# Patient Record
Sex: MALE | Race: WHITE | NOT HISPANIC OR LATINO | Employment: FULL TIME | URBAN - METROPOLITAN AREA
[De-identification: names, ages, dates, MRNs, and addresses within clinical notes are randomized per-mention and may not be internally consistent; named-entity substitution may affect disease eponyms.]

---

## 2018-11-09 ENCOUNTER — APPOINTMENT (OUTPATIENT)
Dept: RADIOLOGY | Facility: CLINIC | Age: 33
End: 2018-11-09
Attending: FAMILY MEDICINE
Payer: COMMERCIAL

## 2018-11-09 ENCOUNTER — OFFICE VISIT (OUTPATIENT)
Dept: URGENT CARE | Facility: CLINIC | Age: 33
End: 2018-11-09
Payer: COMMERCIAL

## 2018-11-09 VITALS
DIASTOLIC BLOOD PRESSURE: 80 MMHG | BODY MASS INDEX: 30.56 KG/M2 | HEIGHT: 73 IN | RESPIRATION RATE: 16 BRPM | TEMPERATURE: 99.4 F | WEIGHT: 230.6 LBS | OXYGEN SATURATION: 96 % | HEART RATE: 104 BPM | SYSTOLIC BLOOD PRESSURE: 115 MMHG

## 2018-11-09 DIAGNOSIS — R05.9 COUGH: ICD-10-CM

## 2018-11-09 DIAGNOSIS — J20.9 ACUTE BRONCHITIS, UNSPECIFIED ORGANISM: Primary | ICD-10-CM

## 2018-11-09 PROCEDURE — 99203 OFFICE O/P NEW LOW 30 MIN: CPT | Performed by: FAMILY MEDICINE

## 2018-11-09 PROCEDURE — 71046 X-RAY EXAM CHEST 2 VIEWS: CPT

## 2018-11-09 RX ORDER — METHYLPREDNISOLONE 4 MG/1
TABLET ORAL
Qty: 21 TABLET | Refills: 0 | Status: SHIPPED | OUTPATIENT
Start: 2018-11-09 | End: 2019-01-03 | Stop reason: ALTCHOICE

## 2018-11-09 RX ORDER — BENZONATATE 200 MG/1
200 CAPSULE ORAL 3 TIMES DAILY PRN
Qty: 20 CAPSULE | Refills: 0 | Status: SHIPPED | OUTPATIENT
Start: 2018-11-09 | End: 2019-01-03 | Stop reason: ALTCHOICE

## 2018-11-09 RX ORDER — ALBUTEROL SULFATE 90 UG/1
1 AEROSOL, METERED RESPIRATORY (INHALATION) EVERY 6 HOURS PRN
Qty: 8.5 G | Refills: 0 | Status: SHIPPED | OUTPATIENT
Start: 2018-11-09

## 2018-11-09 RX ORDER — AZITHROMYCIN 250 MG/1
TABLET, FILM COATED ORAL
Qty: 6 TABLET | Refills: 0 | Status: SHIPPED | OUTPATIENT
Start: 2018-11-09 | End: 2018-11-13

## 2018-11-09 NOTE — PROGRESS NOTES
[facility]        NAME: Yesica Patino is a 34 y o  male  : 1985    MRN: [id number]  DATE: 2018  TIME: 1:42 PM    Assessment and Plan   Acute bronchitis, unspecified organism [J20 9]  1  Acute bronchitis, unspecified organism  XR chest pa & lateral    azithromycin (ZITHROMAX) 250 mg tablet    benzonatate (TESSALON) 200 MG capsule    albuterol (PROAIR HFA) 90 mcg/act inhaler    Methylprednisolone 4 MG TBPK         Patient Instructions     Patient Instructions   1  Acute Bronchitis   - CXR shows no signs of pneumonia, however we are awaiting official radiology read  - Zithromax prescribed, complete as directed   - use Tessalon pearls to help w/ cough   - Medrol dose pack prescribed to help w/ wheezing   - use Albuterol inhaler as needed  - rest and drink plenty of fluids   - run a humidifier at home   - follow up w/ pcp for re-check in 5-7 days   - if symptoms persist despite treatment, worsen, or any new symptoms present, should be seen in the ER     Chief Complaint     Chief Complaint   Patient presents with    Cold Like Symptoms     has had symptoms x 1 mo, now this week getting progressively worse, sputum is green/dark brown  Has had chills fever 100 6 feels ill now  History of Present Illness       35 yo male presents c/o productive cough and chest congestion x 1 month  He also has mild nasal congestion, rhinorrhea, and sinus pressure  No chest pain or SOB, but has felt chest tightness and wheezing  He had a fever of 100 6 two days ago, last night temp was 100 1  No body aches or headache  No GI sx  No skin rashes  No recent travel or known sick contacts  Patient is an everyday smoker  He has been taking Mucinex-DM, Robitussin, and Sudafed as needed with no relief  Review of Systems   Review of Systems   Constitutional: Negative  HENT:        As noted in HPI   Eyes: Negative  Respiratory:        As noted in HPI   Cardiovascular: Negative      Gastrointestinal: Negative  Musculoskeletal: Negative  Skin: Negative  Neurological: Negative  Current Medications       Current Outpatient Prescriptions:     albuterol (PROAIR HFA) 90 mcg/act inhaler, Inhale 1 puff every 6 (six) hours as needed for wheezing or shortness of breath, Disp: 8 5 g, Rfl: 0    azithromycin (ZITHROMAX) 250 mg tablet, Take 2 tablets today then 1 tablet daily x 4 days, Disp: 6 tablet, Rfl: 0    benzonatate (TESSALON) 200 MG capsule, Take 1 capsule (200 mg total) by mouth 3 (three) times a day as needed for cough, Disp: 20 capsule, Rfl: 0    Methylprednisolone 4 MG TBPK, Use as directed on package, Disp: 21 tablet, Rfl: 0    Current Allergies     Allergies as of 11/09/2018    (No Known Allergies)            The following portions of the patient's history were reviewed and updated as appropriate: allergies, current medications, past family history, past medical history, past social history, past surgical history and problem list      History reviewed  No pertinent past medical history  History reviewed  No pertinent surgical history  Family History   Problem Relation Age of Onset    Hypertension Father          Medications have been verified  Objective   /80   Pulse 104   Temp 99 4 °F (37 4 °C)   Resp 16   Ht 6' 0 5" (1 842 m)   Wt 105 kg (230 lb 9 6 oz)   SpO2 96%   BMI 30 85 kg/m²        Physical Exam     Physical Exam   Constitutional: He is oriented to person, place, and time  Vital signs are normal  He appears well-developed and well-nourished  He is active and cooperative  Non-toxic appearance  He does not have a sickly appearance  He does not appear ill  No distress  HENT:   Head: Normocephalic and atraumatic     Right Ear: Tympanic membrane, external ear and ear canal normal    Left Ear: Tympanic membrane, external ear and ear canal normal    Nose: Nose normal    Mouth/Throat: Uvula is midline, oropharynx is clear and moist and mucous membranes are normal    Eyes: Pupils are equal, round, and reactive to light  Conjunctivae are normal    Neck: Normal range of motion  Neck supple  Cardiovascular: Normal rate, regular rhythm and normal heart sounds  Pulmonary/Chest: Effort normal  No accessory muscle usage  No respiratory distress  He has wheezes in the right upper field, the right middle field, the right lower field, the left upper field, the left middle field and the left lower field  He has rhonchi in the right upper field, the right middle field, the right lower field, the left upper field, the left middle field and the left lower field  Lymphadenopathy:     He has no cervical adenopathy  Neurological: He is alert and oriented to person, place, and time  Skin: He is not diaphoretic  Psychiatric: He has a normal mood and affect  His behavior is normal  Judgment and thought content normal    Nursing note and vitals reviewed

## 2018-11-09 NOTE — PATIENT INSTRUCTIONS
1  Acute Bronchitis   - CXR shows no signs of pneumonia, however we are awaiting official radiology read  - Zithromax prescribed, complete as directed   - use Tessalon pearls to help w/ cough   - Medrol dose pack prescribed to help w/ wheezing   - use Albuterol inhaler as needed  - rest and drink plenty of fluids   - run a humidifier at home   - follow up w/ pcp for re-check in 5-7 days   - if symptoms persist despite treatment, worsen, or any new symptoms present, should be seen in the ER

## 2019-01-03 ENCOUNTER — APPOINTMENT (OUTPATIENT)
Dept: RADIOLOGY | Facility: CLINIC | Age: 34
End: 2019-01-03
Attending: FAMILY MEDICINE
Payer: COMMERCIAL

## 2019-01-03 ENCOUNTER — OFFICE VISIT (OUTPATIENT)
Dept: URGENT CARE | Facility: CLINIC | Age: 34
End: 2019-01-03
Payer: COMMERCIAL

## 2019-01-03 VITALS
DIASTOLIC BLOOD PRESSURE: 58 MMHG | HEART RATE: 98 BPM | BODY MASS INDEX: 31.3 KG/M2 | WEIGHT: 234 LBS | OXYGEN SATURATION: 100 % | TEMPERATURE: 101.1 F | SYSTOLIC BLOOD PRESSURE: 102 MMHG | RESPIRATION RATE: 16 BRPM

## 2019-01-03 DIAGNOSIS — R05.9 COUGH: ICD-10-CM

## 2019-01-03 DIAGNOSIS — J11.1 INFLUENZA: Primary | ICD-10-CM

## 2019-01-03 PROCEDURE — 71046 X-RAY EXAM CHEST 2 VIEWS: CPT

## 2019-01-03 PROCEDURE — 99213 OFFICE O/P EST LOW 20 MIN: CPT | Performed by: FAMILY MEDICINE

## 2019-01-03 RX ORDER — OSELTAMIVIR PHOSPHATE 75 MG/1
75 CAPSULE ORAL 2 TIMES DAILY
Qty: 10 CAPSULE | Refills: 0 | Status: SHIPPED | OUTPATIENT
Start: 2019-01-03 | End: 2019-01-08

## 2019-01-03 NOTE — PROGRESS NOTES
[facility]        NAME: Stafford Gowers is a 34 y o  male  : 1985    MRN: [id number]  DATE: January 3, 2019  TIME: 6:49 PM    Assessment and Plan   Influenza [J11 1]  1  Influenza  XR chest pa & lateral    oseltamivir (TAMIFLU) 75 mg capsule         Patient Instructions     Patient Instructions   1  Influenza  - CXR shows no acute abnormalities, however we are awaiting official radiology read  - Tamiflu prescribed, complete as directed, side effects discussed, appropriate warnings given   - rest and drink plenty of fluids   - take Tylenol or Motrin as needed  - may use OTC cold medicines as needed  - if symptoms persist despite treatment, worsen, or any new symptoms present, should be seen in the ER     Follow up with PCP in 1-2 days  Proceed to  ER if symptoms worsen  Chief Complaint     Chief Complaint   Patient presents with    Cold Like Symptoms     headaches, cough, bodyaches, fever, started yesterday         History of Present Illness       35 yo male presents c/o fever, chills, headache, body aches, and dry cough x 1 day  He states his temp has been as high as 104 0 at home  He is currently febrile in the office with a temp of 101  1  He began with mild rhinorrhea and sore throat today  No chest pain, SOB, or wheezing  Non-smoker  No GI sx  No skin rashes  He has been taking Mucinex and Ibuprofen as needed  He did not receive the flu shot this year  He has had sick contacts with similar symptoms  Review of Systems   Review of Systems   Constitutional: Positive for fever  HENT:        As noted in HPI   Eyes: Negative  Respiratory: Positive for cough  Negative for shortness of breath and wheezing  Cardiovascular: Negative  Gastrointestinal: Negative  Genitourinary: Negative  Musculoskeletal: Positive for myalgias  Skin: Negative  Neurological: Positive for headaches           Current Medications       Current Outpatient Prescriptions:     albuterol (PROAIR HFA) 90 mcg/act inhaler, Inhale 1 puff every 6 (six) hours as needed for wheezing or shortness of breath, Disp: 8 5 g, Rfl: 0    Current Allergies     Allergies as of 01/03/2019    (No Known Allergies)            The following portions of the patient's history were reviewed and updated as appropriate: allergies, current medications, past family history, past medical history, past social history, past surgical history and problem list      Past Medical History:   Diagnosis Date    Patient denies medical problems        Past Surgical History:   Procedure Laterality Date    NO PAST SURGERIES         Family History   Problem Relation Age of Onset    Hypertension Father          Medications have been verified  Objective   /58   Pulse 98   Temp (!) 101 1 °F (38 4 °C)   Resp 16   Wt 106 kg (234 lb)   SpO2 100%   BMI 31 30 kg/m²        Physical Exam     Physical Exam   Constitutional: He is oriented to person, place, and time  He appears well-developed and well-nourished  He is active and cooperative  Non-toxic appearance  He does not have a sickly appearance  He does not appear ill  No distress  Febrile on exam    HENT:   Head: Normocephalic and atraumatic  Right Ear: Tympanic membrane, external ear and ear canal normal    Left Ear: Tympanic membrane, external ear and ear canal normal    Nose: Nose normal    Mouth/Throat: Uvula is midline, oropharynx is clear and moist and mucous membranes are normal    Eyes: Conjunctivae and EOM are normal    Neck: Normal range of motion  Neck supple  Cardiovascular: Normal rate, regular rhythm and normal heart sounds  Pulmonary/Chest: Effort normal and breath sounds normal  No accessory muscle usage  No respiratory distress  Lymphadenopathy:     He has no cervical adenopathy  Neurological: He is alert and oriented to person, place, and time  Skin: Skin is warm and dry  No rash noted  He is not diaphoretic  Psychiatric: He has a normal mood and affect  His behavior is normal  Judgment and thought content normal    Nursing note and vitals reviewed